# Patient Record
Sex: FEMALE | Race: WHITE | Employment: UNEMPLOYED | URBAN - METROPOLITAN AREA
[De-identification: names, ages, dates, MRNs, and addresses within clinical notes are randomized per-mention and may not be internally consistent; named-entity substitution may affect disease eponyms.]

---

## 2021-03-31 ENCOUNTER — TELEPHONE (OUTPATIENT)
Dept: CARDIOLOGY CLINIC | Age: 46
End: 2021-03-31

## 2021-03-31 NOTE — TELEPHONE ENCOUNTER
3/31/2021 at 2:04 left message call to provide insurance information for appointment with Morenita Ott MD on 4/1/2021

## 2021-04-01 ENCOUNTER — OFFICE VISIT (OUTPATIENT)
Dept: CARDIOLOGY CLINIC | Age: 46
End: 2021-04-01
Payer: MEDICAID

## 2021-04-01 VITALS
HEIGHT: 64 IN | RESPIRATION RATE: 13 BRPM | DIASTOLIC BLOOD PRESSURE: 80 MMHG | SYSTOLIC BLOOD PRESSURE: 110 MMHG | BODY MASS INDEX: 22.09 KG/M2 | OXYGEN SATURATION: 99 % | WEIGHT: 129.4 LBS | HEART RATE: 89 BPM

## 2021-04-01 DIAGNOSIS — R00.2 PALPITATIONS: Primary | ICD-10-CM

## 2021-04-01 DIAGNOSIS — R06.89 DYSPNEA AND RESPIRATORY ABNORMALITY: ICD-10-CM

## 2021-04-01 DIAGNOSIS — F41.9 ANXIETY: ICD-10-CM

## 2021-04-01 DIAGNOSIS — R06.00 DYSPNEA AND RESPIRATORY ABNORMALITY: ICD-10-CM

## 2021-04-01 PROCEDURE — 99203 OFFICE O/P NEW LOW 30 MIN: CPT | Performed by: INTERNAL MEDICINE

## 2021-04-01 PROCEDURE — 93000 ELECTROCARDIOGRAM COMPLETE: CPT | Performed by: INTERNAL MEDICINE

## 2021-04-01 NOTE — PROGRESS NOTES
Reason for consult: palpitations     HPI: Lendell Libman, a 41y.o. year-old who presents for evaluation of palpitations. For about two months she felt like her heart was beating weird, would happen 2 hours at a time, would occur a few days in a row and then go away for a few weeks and then come back   They were worse with movement or breathing in   She has not had any since January 2021  No chest pain   No dyspnea with exertion  No dizziness or syncope  She drinks 3 cups of coffee every morning  She sleeps well most nights   Admits to being very stressed  Drinks 1 one glass of wine nightly   Does a lot of yoga and walking   No recent allergy or cold medications  No recent labs   No PCP  Kids age 15, 8 and 9    We discussed how the stress of the pandemic 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  She agrees that this is likely a trigger for her symptoms. We reviewed high risk features associated with palpitations when to worry when to call and when to seek ER evaluation. At this point she has been about 2 months now without a recurrence of her symptoms so I do not feel like loop monitoring would be of additional benefit at present. She will send me a message through [address] should her symptoms recur and we can seek monitoring at that time. In addition she will work on stress control continue with her exercise and see how things go. We discussed the possibility of as needed medications and have deferred for now. Reviewed her old echo/stress echo testing as well as her old Holter monitor showing PACs and I see no role for additional imaging today given her resolution of symptoms and normal exam.    Assessment & Plan:  1. Anxiety- high, no meds at this time  2.  Palpitations- sound benign, likely related to anxiety, since symptoms have resolved will just continue to watch for symptoms  -advised her to let me know if palpitations recur and then I will order a 2 week loop monitor to assess for arrhythmias  -will check CMP, Mg level, TSH and T4  -advised her to go to the ER if she feels dizzy, gets chest pain or shortness of breath with episodes or if her episode lasts consistently for one hour or if she develops any neurologic symptoms   -discussed medication for suppression if episodes become problematic, she wants to defer this for now   -she will follow up here in one year if palpitations remain stable     Stress Echo 4/12 - no ischemia  Holter Monitor 3/12 - NSR, PACs    Fam Hx: no CAD  Soc Hx: no tobacco use, occ etoh use     She  has no past medical history on file. Cardiovascular ROS: no chest pain or dyspnea on exertion  Respiratory ROS: no cough, shortness of breath, or wheezing  Neurological ROS: no TIA or stroke symptoms  All other systems negative except as above. PE  Vitals:    04/01/21 1101   BP: 110/80   Pulse: 89   Resp: 13   SpO2: 99%   Weight: 129 lb 6.4 oz (58.7 kg)   Height: 5' 4\" (1.626 m)    Body mass index is 22.21 kg/m².    General appearance - alert, well appearing, and in no distress  Mental status - affect appropriate to mood  Eyes - sclera anicteric, moist mucous membranes  Neck - supple  Lymphatics - not assessed  Chest - clear to auscultation, no wheezes, rales or rhonchi  Heart - normal rate, regular rhythm, normal S1, S2, no murmurs, rubs, clicks or gallops  Abdomen - soft, nontender, nondistended  Back exam - full range of motion, no tenderness  Neurological - cranial nerves II through XII grossly intact, no focal deficit  Musculoskeletal - no muscular tenderness noted, normal strength  Extremities - peripheral pulses normal, no pedal edema  Skin - normal coloration  no rashes    12 lead ECG: NSR    Recent Labs:  No results found for: CHOL, CHOLX, CHLST, CHOLV, 372244, HDL, HDLP, LDL, LDLC, DLDLP, TGLX, TRIGL, TRIGP, CHHD, CHHDX  No results found for: VIK, ACREA, CREA, REFC3, REFC4  No results found for: BUN, IBUN, MBUNV, BUNV  No results found for: K, KI, PLK, WBK  No results found for: HBA1C, HGBE8, PXU2ABTD, [id number]  No results found for: HGB, HGBP, HGBEXT, HGBEXT  No results found for: PLT, PLTEXT, PLTEXT    Reviewed:  No past medical history on file. Social History     Tobacco Use   Smoking Status Never Smoker   Smokeless Tobacco Never Used     Social History     Substance and Sexual Activity   Alcohol Use Yes    Frequency: 2-3 times a week    Drinks per session: 1 or 2     Allergies   Allergen Reactions    Sulfa (Sulfonamide Antibiotics) Anaphylaxis       No current outpatient medications on file. No current facility-administered medications for this visit.         Adonis Garsia MD  [facility] [address], [address] [place] [address]  [facility]  [phone]

## 2021-04-15 ENCOUNTER — TELEPHONE (OUTPATIENT)
Dept: CARDIOLOGY CLINIC | Age: 46
End: 2021-04-15

## 2021-04-15 NOTE — TELEPHONE ENCOUNTER
patient states she would like to have the heart monitor mailed to her address.      Phone: [phone]

## 2021-04-16 ENCOUNTER — TELEPHONE (OUTPATIENT)
Dept: CARDIOLOGY CLINIC | Age: 46
End: 2021-04-16

## 2021-04-16 NOTE — TELEPHONE ENCOUNTER
----- Message from Naseem Grady RN sent at 4/16/2021 11:29 AM EDT -----  2 week loop for palps per gonzalez.  Thanks

## 2021-04-16 NOTE — TELEPHONE ENCOUNTER
Returned call to patient and advised loop monitor has been ordered. [employer] instructions provided.      2 pt identifiers used

## 2021-05-11 ENCOUNTER — TELEPHONE (OUTPATIENT)
Dept: CARDIOLOGY CLINIC | Age: 46
End: 2021-05-11